# Patient Record
Sex: MALE | Race: WHITE | URBAN - METROPOLITAN AREA
[De-identification: names, ages, dates, MRNs, and addresses within clinical notes are randomized per-mention and may not be internally consistent; named-entity substitution may affect disease eponyms.]

---

## 2023-05-21 ENCOUNTER — OFFICE VISIT (OUTPATIENT)
Dept: FAMILY MEDICINE CLINIC | Facility: CLINIC | Age: 23
End: 2023-05-21
Payer: COMMERCIAL

## 2023-05-21 VITALS
BODY MASS INDEX: 18.88 KG/M2 | HEART RATE: 82 BPM | RESPIRATION RATE: 16 BRPM | HEIGHT: 76 IN | TEMPERATURE: 98 F | SYSTOLIC BLOOD PRESSURE: 117 MMHG | OXYGEN SATURATION: 98 % | DIASTOLIC BLOOD PRESSURE: 63 MMHG | WEIGHT: 155 LBS

## 2023-05-21 DIAGNOSIS — H66.012 NON-RECURRENT ACUTE SUPPURATIVE OTITIS MEDIA OF LEFT EAR WITH SPONTANEOUS RUPTURE OF TYMPANIC MEMBRANE: Primary | ICD-10-CM

## 2023-05-21 PROCEDURE — 99202 OFFICE O/P NEW SF 15 MIN: CPT | Performed by: PHYSICIAN ASSISTANT

## 2023-05-21 PROCEDURE — 3008F BODY MASS INDEX DOCD: CPT | Performed by: PHYSICIAN ASSISTANT

## 2023-05-21 PROCEDURE — 3074F SYST BP LT 130 MM HG: CPT | Performed by: PHYSICIAN ASSISTANT

## 2023-05-21 PROCEDURE — 3078F DIAST BP <80 MM HG: CPT | Performed by: PHYSICIAN ASSISTANT

## 2023-05-21 RX ORDER — AMOXICILLIN AND CLAVULANATE POTASSIUM 875; 125 MG/1; MG/1
1 TABLET, FILM COATED ORAL 2 TIMES DAILY
Qty: 20 TABLET | Refills: 0 | Status: SHIPPED | OUTPATIENT
Start: 2023-05-21 | End: 2023-05-31

## 2023-05-21 RX ORDER — OFLOXACIN 3 MG/ML
10 SOLUTION AURICULAR (OTIC) DAILY
Qty: 10 ML | Refills: 0 | Status: SHIPPED | OUTPATIENT
Start: 2023-05-21 | End: 2023-05-28

## 2023-12-15 ENCOUNTER — OFFICE VISIT (OUTPATIENT)
Dept: FAMILY MEDICINE CLINIC | Facility: CLINIC | Age: 23
End: 2023-12-15
Payer: COMMERCIAL

## 2023-12-15 VITALS
OXYGEN SATURATION: 98 % | WEIGHT: 155 LBS | RESPIRATION RATE: 18 BRPM | HEIGHT: 75 IN | BODY MASS INDEX: 19.27 KG/M2 | SYSTOLIC BLOOD PRESSURE: 118 MMHG | DIASTOLIC BLOOD PRESSURE: 64 MMHG | HEART RATE: 87 BPM | TEMPERATURE: 98 F

## 2023-12-15 DIAGNOSIS — J02.9 SORE THROAT: Primary | ICD-10-CM

## 2023-12-15 LAB
CONTROL LINE PRESENT WITH A CLEAR BACKGROUND (YES/NO): YES YES/NO
KIT LOT #: NORMAL NUMERIC
STREP GRP A CUL-SCR: NEGATIVE

## 2023-12-15 PROCEDURE — 99213 OFFICE O/P EST LOW 20 MIN: CPT | Performed by: FAMILY MEDICINE

## 2023-12-15 PROCEDURE — 87880 STREP A ASSAY W/OPTIC: CPT | Performed by: FAMILY MEDICINE

## 2023-12-15 PROCEDURE — 3008F BODY MASS INDEX DOCD: CPT | Performed by: FAMILY MEDICINE

## 2023-12-15 PROCEDURE — 3074F SYST BP LT 130 MM HG: CPT | Performed by: FAMILY MEDICINE

## 2023-12-15 PROCEDURE — 3078F DIAST BP <80 MM HG: CPT | Performed by: FAMILY MEDICINE

## 2024-03-03 ENCOUNTER — HOSPITAL ENCOUNTER (OUTPATIENT)
Age: 24
Discharge: HOME | End: 2024-03-03
Payer: COMMERCIAL

## 2024-03-03 ENCOUNTER — APPOINTMENT (OUTPATIENT)
Dept: GENERAL RADIOLOGY | Age: 24
End: 2024-03-03
Attending: PHYSICIAN ASSISTANT
Payer: COMMERCIAL

## 2024-03-03 VITALS
WEIGHT: 160 LBS | TEMPERATURE: 98 F | HEART RATE: 70 BPM | SYSTOLIC BLOOD PRESSURE: 123 MMHG | DIASTOLIC BLOOD PRESSURE: 73 MMHG | BODY MASS INDEX: 19.89 KG/M2 | OXYGEN SATURATION: 98 % | RESPIRATION RATE: 18 BRPM | HEIGHT: 75 IN

## 2024-03-03 DIAGNOSIS — S92.322A CLOSED DISPLACED FRACTURE OF SECOND METATARSAL BONE OF LEFT FOOT, INITIAL ENCOUNTER: Primary | ICD-10-CM

## 2024-03-03 PROCEDURE — E0114 CRUTCH UNDERARM PAIR NO WOOD: HCPCS | Performed by: NURSE PRACTITIONER

## 2024-03-03 PROCEDURE — 29515 APPLICATION SHORT LEG SPLINT: CPT | Performed by: NURSE PRACTITIONER

## 2024-03-03 PROCEDURE — 73630 X-RAY EXAM OF FOOT: CPT | Performed by: PHYSICIAN ASSISTANT

## 2024-03-03 PROCEDURE — 99203 OFFICE O/P NEW LOW 30 MIN: CPT | Performed by: NURSE PRACTITIONER

## 2024-03-03 RX ORDER — NAPROXEN 500 MG/1
500 TABLET ORAL 2 TIMES DAILY PRN
Qty: 20 TABLET | Refills: 0 | Status: SHIPPED | OUTPATIENT
Start: 2024-03-03 | End: 2024-03-10

## 2024-03-03 RX ORDER — IBUPROFEN 600 MG/1
600 TABLET ORAL ONCE
Status: COMPLETED | OUTPATIENT
Start: 2024-03-03 | End: 2024-03-03

## 2024-03-03 NOTE — ED PROVIDER NOTES
Patient Seen in: [facility]      History     Chief Complaint   Patient presents with    Leg or Foot Injury     Stated Complaint: LEFT FOOT PAIN X 1 DAY    Subjective:   HPI    Patient is a 23-year-old male with history of Achilles tendon tear and tendinitis here for evaluation of left foot pain.  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  Patient was at a meat yesterday, while stretching he felt left foot pain and then continued to run in his race which was 3K.  Has he ran, pain worsened.  Denies fall or obvious traumatic event precipitating onset of pain.  Has not taken over-the-counter medication for symptoms.  Pain is reproducible with weightbearing    Objective:   History reviewed. No pertinent past medical history.           No pertinent past surgical history.              No pertinent social history.            Review of Systems    Positive for stated complaint: LEFT FOOT PAIN X 1 DAY  Other systems are as noted in HPI.  Constitutional and vital signs reviewed.      All other systems reviewed and negative except as noted above.    Physical Exam     ED Triage Vitals [03/03/24 1225]   /73   Pulse 70   Resp 18   Temp 98 °F (36.7 °C)   Temp src Oral   SpO2 98 %   O2 Device None (Room air)       Current:/73   Pulse 70   Temp 98 °F (36.7 °C) (Oral)   Resp 18   Ht 190.5 cm (6' 3\")   Wt 72.6 kg   SpO2 98%   BMI 20.00 kg/m²         Physical Exam  Vitals and nursing note reviewed.   Constitutional:       General: He is not in acute distress.     Appearance: He is not ill-appearing, toxic-appearing or diaphoretic.   Eyes:      Conjunctiva/sclera: Conjunctivae normal.      Pupils: Pupils are equal, round, and reactive to light.   Cardiovascular:      Rate and Rhythm: Normal rate.      Pulses: Normal pulses.   Pulmonary:      Effort: Pulmonary effort is normal.   Musculoskeletal:      Left foot: No prominent metatarsal heads.        Feet:    Feet:      Right  foot:      Skin integrity: Skin integrity normal.      Left foot:      Skin integrity: Skin integrity normal.      Comments: Bony tenderness along the proximal metatarsal region.  There is no obvious ecchymosis, mild edema noted.  Neurological:      Mental Status: He is alert.         ED Course   Labs Reviewed - No data to display  XR FOOT, COMPLETE (MIN 3 VIEWS), LEFT (CPT=73630)    Result Date: 3/3/2024  CONCLUSION:  Comminuted and displaced fractures involving the proximal shaft of the left 2nd metatarsal with associated soft tissue swelling.    LOCATION:  [id number]   Dictated by (CST): Daniel Hopkins MD on 3/03/2024 at 1:26 PM     Finalized by (CST): Daniel Hopkins MD on 3/03/2024 at 1:26 PM                       MDM                                       Medical Decision Making  Differentials include but are not limited to stress fracture, tendinitis and fracture.  X-ray does reveal comminuted and displaced fractures involving the second metatarsal.  Short leg splint applied and crutches for mobility assistance.  Ibuprofen administered here.  Close outpatient follow-up with podiatry.  Father and patient agree with plan of care.  All questions answered to their satisfaction.    Amount and/or Complexity of Data Reviewed  Radiology: ordered and independent interpretation performed. Decision-making details documented in ED Course.    Risk  OTC drugs.        Disposition and Plan     Clinical Impression:  1. Closed displaced fracture of second metatarsal bone of left foot, initial encounter         Disposition:  Discharge  3/3/2024  2:12 pm    Follow-up:  Marlys Shukla, DPM  [address] [place] [id number]  [phone]    Schedule an appointment as soon as possible for a visit             Medications Prescribed:  Current Discharge Medication List        START taking these medications    Details   naproxen 500 MG Oral Tab Take 1 tablet (500 mg total) by mouth 2 (two) times daily as needed.  Qty: 20  tablet, Refills: 0

## 2024-03-05 ENCOUNTER — LAB ENCOUNTER (OUTPATIENT)
Dept: LAB | Age: 24
End: 2024-03-05
Attending: ORTHOPAEDIC SURGERY
Payer: COMMERCIAL

## 2024-03-05 DIAGNOSIS — S92.309A METATARSAL BONE FRACTURE: Primary | ICD-10-CM

## 2024-03-05 PROCEDURE — 36415 COLL VENOUS BLD VENIPUNCTURE: CPT

## 2024-03-05 PROCEDURE — 82306 VITAMIN D 25 HYDROXY: CPT

## 2024-03-06 ENCOUNTER — ORDER TRANSCRIPTION (OUTPATIENT)
Dept: PHYSICAL THERAPY | Facility: HOSPITAL | Age: 24
End: 2024-03-06

## 2024-03-06 DIAGNOSIS — S92.309A METATARSAL FRACTURE: Primary | ICD-10-CM

## 2024-03-06 LAB — VIT D+METAB SERPL-MCNC: 17.4 NG/ML (ref 30–100)

## 2024-04-16 ENCOUNTER — TELEPHONE (OUTPATIENT)
Dept: PHYSICAL THERAPY | Facility: HOSPITAL | Age: 24
End: 2024-04-16

## 2024-04-18 ENCOUNTER — OFFICE VISIT (OUTPATIENT)
Dept: PHYSICAL THERAPY | Facility: HOSPITAL | Age: 24
End: 2024-04-18
Attending: ORTHOPAEDIC SURGERY
Payer: COMMERCIAL

## 2024-04-18 DIAGNOSIS — S92.309A METATARSAL FRACTURE: Primary | ICD-10-CM

## 2024-04-18 PROCEDURE — 97530 THERAPEUTIC ACTIVITIES: CPT

## 2024-04-18 PROCEDURE — 97162 PT EVAL MOD COMPLEX 30 MIN: CPT

## 2024-04-18 NOTE — PROGRESS NOTES
LOWER EXTREMITY EVALUATION:     Diagnosis:   Metatarsal fracture (S92.309A) Left side, closed      Referring Provider: Sy Aguirre  Date of Evaluation:    4/18/2024    Precautions:  None Next MD visit:   5/29/24  Date of Surgery: n/a     PATIENT SUMMARY   Vince Garcia (preferred name: Reinier) is a 23 year old male who presents to therapy today with complaints of left foot pain.  He reports he had experienced a sharp pain  in the top of his L foot while getting ready for a race at an indoor track meet on 3/17/24.  He reports he then ran in his race and within the first 800m he had significant foot pain. States he finished the race but had severe pain.  Patient went to urgent care the next day and x-ray confirmed a 2nd metatarsal fracture. Patient reports he was in the walking boot for about 7 weeks, using bilateral axillary crutches for the first few weeks and then weaning down to just the boot. Patient reports he has been doing a little cross training, starting this about 2 weeks after the initial injury.  He states he had been riding the bike for about 4 weeks and has done a little elliptical the past 2 weeks, always in the boot. He reports he is tolerating this well.  Patient saw his physician this past Tuesday with x-ray confirming healing of the fracture.  He reports he was cleared to discontinue the boot and transition to weight bearing as tolerated. Pt describes pain level current 0/10, at best 0/10, at worst 1/10 the past few weeks.  States he will get a dull ache occasionally.  Denies any sharp pain.  Denies any N/T. 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  Prior to injury, his longest running distance for training would be about 20 miles.    Current functional limitations include: not performing impact type activities, limiting steps walking out of boot, avoiding certain exercises due to positional loading on the foot (like push up or plank  type activities), not currently running. Patient states his goal for PT is to gradually improve his strength and fitness level while ensuring proper healing and progress to running training to be ready for the fall cross country season.    Reinier describes prior level of function as independent with all ADL and very active with running activities.   Past medical history was reviewed. Significant findings include: nothing warranting precautions or contraindications for participation in PT.    ASSESSMENT  Patient present with findings consistent with medically referred diagnosis. Current impairments including the following: mild pain, decreased flexibility, decreased ROM, decreased strength, decreased proximal stabilization, impaired balance, impaired dynamic LE alignment control. Patient will benefit from physical therapy to address the above impairments and promote return to ADL and sport activities as prior without pain or compensation.     OBJECTIVE:   Observation: mild foot pronation B, R>L, increased femoral MR in standing both R and L (R>L) which is exaggerated in SLS.  Dynamic knee valgus B with squatting and stepping.  Palpation: mild tenderness to palpation over the dorsal aspect of the 2nd and 3rd metatarsal on L  Sensation: unremarkable    AROM: (* denotes performed with pain)  Hip Knee Foot/Ankle   Flexion: WNL  Extension: WNL  Abduction: WNL  ER: R 30; L 35  IR: R 55; L 50 WNL    DF: R 5; L 5  PF: R 40; L 30  INV: R 30; L 25  EV: R 10; L 12       Accessory motion: decreased posterior talar glide on L, decreased dorsal<>volar glide of 2nd-4th metatarsal on L    Flexibility:  Hip Flexor: tightness B  Hamstrings: NT  Piriformis: NT  Quads: tightness B  ITB: short B  Gastroc-soleus: tightness B    Strength/MMT: (* denotes performed with pain)  Hip Knee Foot/Ankle   Flexion: R 5/5; L 5/5  Extension: R 4+/5; L 4+/5  Abduction: R 4-/5; L 4-/5 (compensation of hip flexor bias bilaterally with testing)  ER: R  4-/5; L 4/5  IR: R 4-/5; L 4/5 Flexion: R 5/5; L 5/5  Extension: R 5/5; L 5/5    DF: R 5/5; L 5/5  PF: R 5/5; L 4-/5 (assessed non-WB)  INV: R 5/5; L 4+/5  EV: R 5/5; L 4+/5       Special tests:   Dynamic testing significant for dynamic knee valgus with DL squat and forward step up    Gait: pt ambulates on level ground without limp but note decrease toe off contributing to decreased step length.  Note increased femoral MR and foot pronation in stance phase of gait B  Balance: SLS: R 18 sec, L 12 sec    Today’s Treatment and Response:   Pt education was provided on exam findings, treatment diagnosis, treatment plan, expectations, and prognosis. Pt was also provided recommendations for weaning out of the boot as needed, avoiding non-use of the boot if he is experiencing onset of soreness with ADL in shoe. Discussion regarding cross-training with recommendation to incorporate swimming for cardio training and limiting elliptical as this is still mild impact to the healing foot - patient is agreeable to this. Brief discussion regarding foot wear with patient discouraged from purchasing new shoes at this time, waiting a few weeks until fully normalized gait is achieved.      Charges: PT Eval Moderate Complexity, TA      Total Timed Treatment: 12 min     Total Treatment Time: 45 min     Based on clinical rationale and outcome measures, this evaluation involved Moderate Complexity decision making due to 1-2 personal factors/comorbidities, 4+ body structures involved/activity limitations, and evolving symptoms including changing pain levels.  PLAN OF CARE:    Goals: (to be met in 18 visits)  Patient demonstrating improved DF >=10 deg to promote improved gait and proper mechanics with descending step and squatting  Improved hip strength to 5/5 throughout to promote improved proximal stabilization with patient performing squatting and stepping activities without dynamic knee valgus  Improved ankle strength to 5/5 throughout  with patient demonstrating improved ankle stabilization with SLB >30 sec on airex foam without LOB  Patient reporting ability to run >5 miles without onset of foot pain to promote full return to training as prior  Independent with a comprehensive HEP    Frequency / Duration: Patient will be seen for 2 x/week or a total of 18 visits over a 90 day period. Treatment will include: Gait training, Manual Therapy, Neuromuscular Re-education, Therapeutic Activities, Therapeutic Exercise, and Home Exercise Program instruction, Modalities as needed.    Education or treatment limitation: None  Rehab Potential:good    LEFS Score  LEFS Score: 86.25 % (4/18/2024  4:39 PM)      Patient/Family/Caregiver was advised of these findings, precautions, and treatment options and has agreed to actively participate in planning and for this course of care.    Thank you for your referral. Please co-sign or sign and return this letter via fax as soon as possible to [phone]. If you have any questions, please contact me at Dept: [phone]    Sincerely,  Electronically signed by therapist: Audelia Easley PT  Physician's certification required: Yes  I certify the need for these services furnished under this plan of treatment and while under my care.    X___________________________________________________ Date____________________    Certification From: 4/18/2024  To:7/17/2024

## 2024-04-23 ENCOUNTER — OFFICE VISIT (OUTPATIENT)
Dept: PHYSICAL THERAPY | Facility: HOSPITAL | Age: 24
End: 2024-04-23
Attending: ORTHOPAEDIC SURGERY
Payer: COMMERCIAL

## 2024-04-23 PROCEDURE — 97110 THERAPEUTIC EXERCISES: CPT

## 2024-04-23 NOTE — PROGRESS NOTES
Dx: Left 2nd Metatarsal Fracture (S92.309A)    Authorized # of visit: no visit limit/no auth Next MD visits: ~4 weeks  Fall Risk: standard Precautions: per MD order, WB as tolerated (just cleared to discontinue boot on 4/16/24)    Date: 4/23/24  Tx #: 2    Current Pain Level: 0/10      Subjective: Patient reports the foot has been doing pretty good.  States he was at a track meet this past weekend so wore his boot to this as it was a lot of standing and walking and he did fine in the boot.  No c/o any significant pain or discomfort.      Objective: Treatment per flow sheet.      Assessment: Issued the following to HEP today: 4-way ankle PRE, tri-planar marble , towel gastroc and soleus stretch, tband sdly clam, fire hydrant, single leg bridge, and tband ER chair squat.  Patient requiring minimal verbal/tactile cuing with clam and fire hydrant for proper lumbo-pelvic stabilization.  Patient benefits for use of mirror for visual feedback with performance of weight bearing exercises to promote increased hip ABD/ER facilitation to actively prevent dynamic knee valgus.      Plan: Continue per plan of care.      Treatment Flow:  Pt edu in and performance of HEP per below  A/P tilt board tap x30  Bosu squat x20  Bosu fsu and through x10 on R  6\" fsu on L w/ RTB ER x15  Red SPRI lateral walk x2 laps  Green SPRI glut med retro walk x2 laps      (HEP: 4-way ankle PRE, tri-planar marble , towel gastroc and soleus stretch, tband sdly clam, fire hydrant, single leg bridge, and tband ER chair squat)    Charges: 3 LIBBY  Total Timed Treatment: 45 minutes  Total Treatment Time: 45 minutes

## 2024-04-25 ENCOUNTER — OFFICE VISIT (OUTPATIENT)
Dept: PHYSICAL THERAPY | Facility: HOSPITAL | Age: 24
End: 2024-04-25
Attending: ORTHOPAEDIC SURGERY
Payer: COMMERCIAL

## 2024-04-25 PROCEDURE — 97110 THERAPEUTIC EXERCISES: CPT

## 2024-04-25 PROCEDURE — 97140 MANUAL THERAPY 1/> REGIONS: CPT

## 2024-04-25 NOTE — PROGRESS NOTES
Dx: Left 2nd Metatarsal Fracture (S92.309A)    Authorized # of visit: no visit limit/no auth Next MD visits: ~4 weeks  Fall Risk: standard Precautions: per MD order, WB as tolerated (just cleared to discontinue boot on 4/16/24)    Date: 4/25/24  Tx #: 3    Current Pain Level: 0/10      Subjective: Patient reports HEP is going well.  No c/o pain.  Still using the boot occasionally but not much, only if anticipating being on his foot for prolonged walking/standing such as at a track meet as spectator.    Objective: Treatment per flow sheet.      Assessment: Patient noting mild discomfort today with manual stretching of 2nd toe extensors but \"stretch, not pain\". Requires initial cuing with leg press activity to prevent dynamic knee valgus. Demonstrating improved control for proper LE alignment today with bosu exercises.  Incorporated \"hip burner\" exercise to HEP with patient tolerating this well with no c/o foot pain and demonstrating excellent glut med facilitation with this exercise.      Plan: Continue to advance therex as tolerated, being mindful of weight bearing and weight distribution with positioning, focusing on core, hip, and LE strengthening and proper LE alignment control in [facility].      Treatment Flow:  Manual: (20 min total)  STM to plantar surface of foot x6'  Manual posterior talar glide, grade 3-4, multiple bouts  Grade 2-3 metatarsal D<>V glides, multiple bouts  2nd toe extensor stretch, multiple bouts  Therex: (25 min)  Shuttle wobble board A/P SLP 1B 1G x30  Shuttle wobble board M/L SLP 1B 1G x30  Tilt board gastrox/soleus stretch x30\" each  Bosu squat w/ RTB ER x20  Bosu fsu and through x10 each R/L  Hip burner x10 each w/ 3\" hold R/L      (HEP: 4-way ankle PRE, tri-planar marble , towel gastroc and soleus stretch, tband sdly clam, fire hydrant, single leg bridge, and tband ER chair squat, hip burner)    Charges: Man, 2 LIBBY  Total Timed Treatment: 45 minutes  Total Treatment Time: 45  minutes

## 2024-04-30 ENCOUNTER — OFFICE VISIT (OUTPATIENT)
Dept: PHYSICAL THERAPY | Facility: HOSPITAL | Age: 24
End: 2024-04-30
Attending: ORTHOPAEDIC SURGERY
Payer: COMMERCIAL

## 2024-04-30 PROCEDURE — 97110 THERAPEUTIC EXERCISES: CPT

## 2024-04-30 PROCEDURE — 97140 MANUAL THERAPY 1/> REGIONS: CPT

## 2024-04-30 NOTE — PROGRESS NOTES
Dx: Left 2nd Metatarsal Fracture (S92.309A)    Authorized # of visit: no visit limit/no auth Next MD visits: ~4 weeks  Fall Risk: standard Precautions: per MD order, WB as tolerated (just cleared to discontinue boot on 4/16/24)    Date: 4/30/24  Tx #: 4    Current Pain Level: 0/10      Subjective: Patient reports foot has been feeling pretty good. States he was on his feet a lot 2 days ago and had a little soreness but otherwise it has been good.     Objective: Treatment per flow sheet.      Assessment: Patient does note some mild discomfort in the foot with 2nd toe extensor stretching today so intensity of stretch was lessened with patient tolerating this well. Patient demonstrating improved self-awareness with therex today for LE alignment control but continues to benefit from visual feedback with use of mirror as this visual feedback is easier to see movement faults than looking down at the LE.  No c/o foot pain with performance of therex and patient demonstrating improved stability on bosu today.    Plan: Continue per plan of care.      Treatment Flow:  Manual: (20 min total)  STM to plantar surface of foot x6'  Manual posterior talar glide, grade 3-4, multiple bouts  Grade 2-3 metatarsal D<>V glides, multiple bouts  2nd toe extensor stretch, multiple bouts  Therex: (25 min)  Shuttle wobble board A/P SLP 1B 1G x30  Shuttle wobble board M/L SLP 1B 1G x30  Tilt board gastroc/soleus stretch x30\" each  Bosu squat w/ RTB ER x20  Bosu fsu and through x10 each R/L  6\" retro lunge to toe tap x15 each R/L      (HEP: 4-way ankle PRE, tri-planar marble , towel gastroc and soleus stretch, tband sdly clam, fire hydrant, single leg bridge, and tband ER chair squat, hip burner)    Charges: Man, 2 LIBBY  Total Timed Treatment: 45 minutes  Total Treatment Time: 45 minutes

## 2024-05-02 ENCOUNTER — OFFICE VISIT (OUTPATIENT)
Dept: PHYSICAL THERAPY | Facility: HOSPITAL | Age: 24
End: 2024-05-02
Attending: ORTHOPAEDIC SURGERY
Payer: COMMERCIAL

## 2024-05-02 PROCEDURE — 97140 MANUAL THERAPY 1/> REGIONS: CPT

## 2024-05-02 PROCEDURE — 97110 THERAPEUTIC EXERCISES: CPT

## 2024-05-02 NOTE — PROGRESS NOTES
Dx: Left 2nd Metatarsal Fracture (S92.309A)    Authorized # of visit: no visit limit/no auth Next MD visits: ~4 weeks  Fall Risk: standard Precautions: per MD order, WB as tolerated (just cleared to discontinue boot on 4/16/24)    Date: 5/2/24  Tx #: 5    Current Pain Level: 0/10      Subjective: Patient reports the foot has been feeling okay with no reports of pain or discomfort of any kind.  HEP is going well and notes he is getting better with the marble pick-up activity.    Objective: Treatment per flow sheet.      Assessment: Patient tolerating addition of BAPs activity today demonstrating good ankle control and varying weight distribution through the foot without c/o pain. Also incorporated Maur[place]ian squat with patient demonstrating good LE alignment control.    Plan: Continue to advance weight bearing activities focusing on core, hip, and LE/foot/ankle strengthening with proper LE alignment control in [facility].      Treatment Flow:  Manual: (20 min total)  STM to plantar surface of foot x6'  Manual posterior talar glide, grade 3-4, multiple bouts  Grade 2-3 metatarsal D<>V glides, multiple bouts  2nd toe extensor stretch, multiple bouts  Therex: (20 min)  Tilt board gastroc/soleus stretch x30\" each  Bosu fsu and through x10 each R/L  6\" retro lunge to toe tap x15 each R/L  L1 BAPS x20 each cw/ccw  Single leg A/P tilt board tap x30  Tilt board stabilization x1'  RTB ER single leg mini squat x15 each R/L  [place]ian squat x15 each R/L      (HEP: 4-way ankle PRE, tri-planar marble , towel gastroc and soleus stretch, tband sdly clam, fire hydrant, single leg bridge, and tband ER chair squat, hip burner)    Charges: Man, 2 LIBBY  Total Timed Treatment: 40 minutes  Total Treatment Time: 40 minutes

## 2024-05-07 ENCOUNTER — OFFICE VISIT (OUTPATIENT)
Dept: PHYSICAL THERAPY | Facility: HOSPITAL | Age: 24
End: 2024-05-07
Attending: ORTHOPAEDIC SURGERY
Payer: COMMERCIAL

## 2024-05-07 PROCEDURE — 97110 THERAPEUTIC EXERCISES: CPT

## 2024-05-07 PROCEDURE — 97140 MANUAL THERAPY 1/> REGIONS: CPT

## 2024-05-07 NOTE — PROGRESS NOTES
Dx: Left 2nd Metatarsal Fracture (S92.309A)    Authorized # of visit: no visit limit/no auth Next MD visits: ~4 weeks  Fall Risk: standard Precautions: per MD order, WB as tolerated (just cleared to discontinue boot on 4/16/24)    Date: 5/7/24  Tx #: 6    Current Pain Level: 0/10      Subjective: Patient reports the foot has been doing well.  Went to a track meet this past weekend but wore the boot to avoid any pain/soreness with prolonged standing/walking.    Objective: Treatment per flow sheet.      Assessment: Patient has no c/o pain with therex today.  Demonstrates slighlty increased difficulty maintaining LE on R compared to left with bosu retro lunge toe tap but this improves significantly across reps.     Plan: Continue to advance weight bearing activities focusing on core, hip, and LE/foot/ankle strengthening with proper LE alignment control in [facility].      Treatment Flow:  Manual: (20 min total)  STM to plantar surface of foot x6'  Manual posterior talar glide, grade 3-4, multiple bouts  Grade 2-3 metatarsal D<>V glides, multiple bouts  2nd toe extensor stretch, multiple bouts  Therex: (20 min)  Tilt board gastroc/soleus stretch x30\" each  Bosu  retro lunge to toe tap x15 each R/L  L1 BAPS x20 each cw/ccw  Shuttle single leg heel raise on L 1B x30  Single leg A/P tilt board tap x30  Tilt board stabilization x1'  RTB ER single leg  Shuttle squat x30 each R/L 2B        (HEP: 4-way ankle PRE, tri-planar marble , towel gastroc and soleus stretch, tband sdly clam, fire hydrant, single leg bridge, and tband ER chair squat, hip burner)    Charges: Man, 2 LIBBY  Total Timed Treatment: 40 minutes  Total Treatment Time: 40 minutes

## 2024-05-09 ENCOUNTER — OFFICE VISIT (OUTPATIENT)
Dept: PHYSICAL THERAPY | Facility: HOSPITAL | Age: 24
End: 2024-05-09
Attending: ORTHOPAEDIC SURGERY
Payer: COMMERCIAL

## 2024-05-09 PROCEDURE — 97140 MANUAL THERAPY 1/> REGIONS: CPT

## 2024-05-09 PROCEDURE — 97110 THERAPEUTIC EXERCISES: CPT

## 2024-05-09 NOTE — PROGRESS NOTES
Dx: Left 2nd Metatarsal Fracture (S92.309A)    Authorized # of visit: no visit limit/no auth Next MD visits: ~4 weeks  Fall Risk: standard Precautions: per MD order, WB as tolerated (just cleared to discontinue boot on 4/16/24)    Date: 5/9/24  Tx #: 7    Current Pain Level: 0/10      Subjective: Patient reports exercises at home have been going well.  No c/o pain with ADL or HEP.  States the foot \"has been feeling pretty good\".    Objective: Treatment per flow sheet.      Assessment: Patient struggling initially with addition of vector lunge with triplanar UE cone reach, demonstrating dynamic knee valgus.  This improves significantly within the first cycle as patient demonstrates improved hip ER recruitment and motor control with verbal cuing.  Patient demonstrating improved excursion into weighted PF today with shuttle heel raise with no c/o pain in the foot.     Plan: Continue per plan of care.      Treatment Flow:  Manual: (20 min total)  STM to plantar surface of foot x6'  Manual posterior talar glide, grade 3-4, multiple bouts  Grade 2-3 metatarsal D<>V glides, multiple bouts  2nd toe extensor stretch, multiple bouts  Therex: (25 min)  SB bridge w/ alt SLR x15  Tilt board gastroc/soleus stretch x30\" each  Bosu single leg squat x15 each R/L  Icelandic squat x15 each R/L  Vector lunge triplanar UE reach x10 each R/L  Single leg A/P tilt board tap x30  Single leg medial/lateral tilt board tap w/ tops x30  Single leg medial/lateral tilt board stabilization  Shuttle single leg heel raise on L 1B 1Y x30      (HEP: 4-way ankle PRE, tri-planar marble , towel gastroc and soleus stretch, tband sdly clam, fire hydrant, single leg bridge, and tband ER chair squat, hip burner)    Charges: Man, 2 LIBBY  Total Timed Treatment: 45 minutes  Total Treatment Time: 45 minutes

## 2024-05-14 ENCOUNTER — APPOINTMENT (OUTPATIENT)
Dept: PHYSICAL THERAPY | Facility: HOSPITAL | Age: 24
End: 2024-05-14
Attending: ORTHOPAEDIC SURGERY
Payer: COMMERCIAL

## 2024-05-16 ENCOUNTER — OFFICE VISIT (OUTPATIENT)
Dept: PHYSICAL THERAPY | Facility: HOSPITAL | Age: 24
End: 2024-05-16
Attending: ORTHOPAEDIC SURGERY
Payer: COMMERCIAL

## 2024-05-16 PROCEDURE — 97110 THERAPEUTIC EXERCISES: CPT

## 2024-05-16 PROCEDURE — 97140 MANUAL THERAPY 1/> REGIONS: CPT

## 2024-05-16 NOTE — PROGRESS NOTES
Dx: Left 2nd Metatarsal Fracture (S92.309A)    Authorized # of visit: no visit limit/no auth Next MD visits: ~4 weeks  Fall Risk: standard Precautions: per MD order, WB as tolerated (just cleared to discontinue boot on 4/16/24)    Date: 5/16/24  Tx #: 8    Current Pain Level: 0/10      Subjective: Patient reports he went in to the city this past Tuesday and did a fair amount of walking and did not have any foot pain.    Objective: Treatment per flow sheet.      Assessment: Patient tolerating addition of powerslide activities today, performing lateral push-off and light lateral impact without c/o foot pain.  Patient demonstrating continued gains in proximal stabilization with therex, requiring only occasional verbal cuing for LE alignment correction throughout today's session.      Plan: Continue per plan of care.      Treatment Flow:  Manual: (10 min total)  STM to plantar surface of foot x5'  Manual posterior talar glide, grade 3-4, multiple bouts  Grade 2-3 metatarsal D<>V glides, multiple bouts  2nd toe extensor stretch, multiple bouts  Therex: (25 min)  Bosu (fsu) single leg squat x15 each R/L  Bosu (fsd) retro lunge toe tap x15 each R/L  Airex SLB multiplanar ball toss 2x10 each R/L  Powerslide lateral slides 2x60 sec  Powerslide mtn climber 2x30\"   Powerslide v-up 2x10  Powerslide lunge x15 each R/L  Single leg A/P tilt board tap x30  Single leg A/P tilt board stabilization x1 min  Single leg outside vector lunge cross body cone reach x10 each R/L      (HEP: 4-way ankle PRE, tri-planar marble , towel gastroc and soleus stretch, tband sdly clam, fire hydrant, single leg bridge, and tband ER chair squat, hip burner)    Charges: Man, 2 LIBBY  Total Timed Treatment: 50 minutes  Total Treatment Time: 50 minutes

## 2024-05-21 ENCOUNTER — OFFICE VISIT (OUTPATIENT)
Dept: PHYSICAL THERAPY | Facility: HOSPITAL | Age: 24
End: 2024-05-21
Attending: ORTHOPAEDIC SURGERY
Payer: COMMERCIAL

## 2024-05-21 PROCEDURE — 97110 THERAPEUTIC EXERCISES: CPT

## 2024-05-21 PROCEDURE — 97140 MANUAL THERAPY 1/> REGIONS: CPT

## 2024-05-21 NOTE — PROGRESS NOTES
Dx: Left 2nd Metatarsal Fracture (S92.309A)    Authorized # of visit: no visit limit/no auth Next MD visits: 5/29/24  Fall Risk: standard Precautions: per MD order, WB as tolerated (just cleared to discontinue boot on 4/16/24)    Date: 5/21/24  Tx #: 9    Current Pain Level: 0/10      Subjective: Patient reports he hiked over the weekend, roughly 5-6 miles, and tolerated this well with no foot pain. Cross-training right now with bike, elliptical, and pool and tolerating this well. Patient follows up with ortho MD next week.    Objective: Treatment per flow sheet.      Assessment: Incorporated light impact activity today in shuttle for weight-lessened single leg jump with patient tolerating this well - it takes the patient 3-4 reps initial to achieve proper LE alignment control through landing phase to prevent dynamic knee valgus but is then able to perform with consistent correct form for the remainder of reps.  Patient demonstrating good tolerance to loaded single leg activity on Airex foam with no reports of foot pain with foot adapting to the uneven surface.      Plan: Continue to advance core, hip, and LE strengthening gradually progressing into impact activities as tolerated (patient has MD follow-up next week with re-imaging to determine state of healing fx).      Treatment Flow:  Manual: (10 min total)  STM to plantar surface of foot x5'  Manual posterior talar glide, grade 3-4, multiple bouts  Grade 2-3 metatarsal D<>V glides, multiple bouts  2nd toe extensor stretch, multiple bouts  Therex: (30 min)  BAPs L2 x20 each cw/ccw  Airex Greenlandic squat x15 each R/L  Bosu (fsu) single leg squat x15 each R/L  Single leg A/P tilt board tap x30  Shuttle single leg jump (1 B) 2x15 each R/L    (HEP: 4-way ankle PRE, tri-planar marble , towel gastroc and soleus stretch, tband sdly clam, fire hydrant, single leg bridge, and tband ER chair squat, hip burner)    Charges: Man, 2 LIBBY  Total Timed Treatment: 40  minutes  Total Treatment Time: 40 minutes

## 2024-05-23 ENCOUNTER — OFFICE VISIT (OUTPATIENT)
Dept: PHYSICAL THERAPY | Facility: HOSPITAL | Age: 24
End: 2024-05-23
Attending: ORTHOPAEDIC SURGERY
Payer: COMMERCIAL

## 2024-05-23 PROCEDURE — 97140 MANUAL THERAPY 1/> REGIONS: CPT

## 2024-05-23 PROCEDURE — 97110 THERAPEUTIC EXERCISES: CPT

## 2024-05-23 NOTE — PROGRESS NOTES
Dx: Left 2nd Metatarsal Fracture (S92.309A)    Authorized # of visit: no visit limit/no auth Next MD visits: 5/29/24  Fall Risk: standard Precautions: per MD order, WB as tolerated (just cleared to discontinue boot on 4/16/24)    Date: 5/23/24  Tx #: 10    Current Pain Level: 0/10      Subjective: Patient reports the foot has been doing well with no c/o pain and tolerating increased time cross training (bike and elliptical) without pain. Patient reports he did a good amount of walking yesterday on grass as well as a moody surface without pain.    Objective: Treatment per flow sheet.      Assessment: Patient tolerating light impact activities today with no c/o pain (powerslide lateral push off and shuttle single leg jump).  Occasional tendency of R LE to fall into dynamic knee valgus with activities but patient is demonstrating improved self-awareness to then self-correct for the next repetition.  Patient is demonstrating good proximal stabilization throughout the core and hips with activities as well as improved balance with Airex stabilization exercise.    Plan: Continue per plan of care.        Treatment Flow:  Manual: (10 min total)  STM to plantar surface of foot x5'  Grade 2-3 metatarsal D<>V glides, multiple bouts  2nd toe extensor stretch, multiple bouts  Therex: (35 min)  Airex Frisian squat x15 each R/L  Single leg A/P tilt board tap x30  Power slide lateral slides 2x45\"  Power slide retro lunge x20 each R/L  Shuttle single leg jump 1B 1Y x20 each R/L  Shuttle alt single leg jump \"jog\": 1B 1Y 2x30  Triplanar vector lunge UE cone reach 2x5 cycles each R/L  Airex SLB multiplanar 1# plyoball \"toss and catch\" 2x10 each R/L    (HEP: 4-way ankle PRE, tri-planar marble , towel gastroc and soleus stretch, tband sdly clam, fire hydrant, single leg bridge, and tband ER chair squat, hip burner)    Charges: Man, 2 LIBBY  Total Timed Treatment: 45 minutes  Total Treatment Time: 45 minutes

## 2024-05-28 ENCOUNTER — OFFICE VISIT (OUTPATIENT)
Dept: PHYSICAL THERAPY | Facility: HOSPITAL | Age: 24
End: 2024-05-28
Attending: ORTHOPAEDIC SURGERY
Payer: COMMERCIAL

## 2024-05-28 PROCEDURE — 97110 THERAPEUTIC EXERCISES: CPT

## 2024-05-28 PROCEDURE — 97140 MANUAL THERAPY 1/> REGIONS: CPT

## 2024-05-28 NOTE — PROGRESS NOTES
Dx: Left 2nd Metatarsal Fracture (S92.309A)    Authorized # of visit: no visit limit/no auth Next MD visits: 5/29/24  Fall Risk: standard Precautions: per MD order, WB as tolerated (just cleared to discontinue boot on 4/16/24)    Date: 5/28/24  Tx #: 11    Current Pain Level: 0/10      Subjective: Patient reports the foot has been doing well with no c/o pain.  Still cross training with no running yet as he sees ortho tomorrow with x-ray to confirm healed fx.    Objective: Treatment per flow sheet.      Assessment: Patient tolerating addition of harness resisted back pedal today with no c/o pain with loaded rear stepping and initially struggling with dynamic knee valgus but is able to self correct within the first several repetitions.  Patient is demonstrating significantly improved LE alignment control with dynamic movements such as lunging and squatting.    Plan: Gradually incorporate impact activities as tolerated next session if imaging confirms healed fracture.        Treatment Flow:  Manual: (10 min total)  STM to plantar surface of foot x5'  Grade 2-3 metatarsal D<>V glides, multiple bouts  2nd toe extensor stretch, multiple bouts  Therex: (30 min)  Single leg A/P tilt board tap x30 each R/L  Power slide lateral slides 2x60\"  Power slide retro lunge x20 each R/L  Harness resisted back pedal squat 2x30'  Harness resisted lateral lunge walk 2x30'  Triplanar vector lunge UE cone reach 2x5 cycles each R/L  L2 BAPs cw/ccw x20 each    (HEP: 4-way ankle PRE, tri-planar marble , towel gastroc and soleus stretch, tband sdly clam, fire hydrant, single leg bridge, and tband ER chair squat, hip burner)    Charges: Man, 2 LIBBY  Total Timed Treatment: 40 minutes  Total Treatment Time: 40 minutes

## 2024-05-30 ENCOUNTER — OFFICE VISIT (OUTPATIENT)
Dept: PHYSICAL THERAPY | Facility: HOSPITAL | Age: 24
End: 2024-05-30
Attending: ORTHOPAEDIC SURGERY
Payer: COMMERCIAL

## 2024-05-30 PROCEDURE — 97140 MANUAL THERAPY 1/> REGIONS: CPT

## 2024-05-30 PROCEDURE — 97110 THERAPEUTIC EXERCISES: CPT

## 2024-06-04 ENCOUNTER — OFFICE VISIT (OUTPATIENT)
Dept: PHYSICAL THERAPY | Facility: HOSPITAL | Age: 24
End: 2024-06-04
Attending: ORTHOPAEDIC SURGERY
Payer: COMMERCIAL

## 2024-06-04 PROCEDURE — 97110 THERAPEUTIC EXERCISES: CPT

## 2024-06-04 NOTE — PROGRESS NOTES
Dx: Left 2nd Metatarsal Fracture (S92.309A)    Authorized # of visit: no visit limit/no auth Next MD visits: 5/29/24  Fall Risk: standard Precautions: per MD order, WB as tolerated (just cleared to discontinue boot on 4/16/24)    Date: 6/4/24  Tx #: 13    Current Pain Level: 0/10      Subjective: Patient reports he ran 3x since his last visit but only up to 1 mile at a time. Tolerated this well.  No c/o foot pain.       Objective: Treatment per flow sheet.      Assessment: Incorporated jumping activities today focusing on LE alignment control through take of and landing phase of both double and single leg jump/hop - patient demonstrating good LE alignment control on L and tolerating impact well.  Mild difficulty controlling alignment on R but patient improves with repetition and verbal cuing.  Patient is tolerating all therex without c/o foot pain.    Plan: Continue per plan of care.        Treatment Flow:  Manual: (6min total)  STM to plantar surface of foot x3  2nd toe extensor stretch, multiple bouts  Therex: (40 min)  Tilt board gastroc stretch 2x30\"  Tilt board soleus stretch 2x30\"  Harness resisted \"jog\" 4x30'  Harness resisted back pedal 4x30'  Harness resisted lateral shuffle  12\" plyobox double leg jump down x10  12\" plyobox double leg jump up x10  Single leg A/P hop over towel roll x10 each R/L  Single leg M/L hop over towel roll x10 each R/L  X grid single leg hop cw/ccw x3 cycles each R/L      (HEP: 4-way ankle PRE, tri-planar marble , towel gastroc and soleus stretch, tband sdly clam, fire hydrant, single leg bridge, and tband ER chair squat, hip burner)    Charges: 3 LIBBY  Total Timed Treatment: 46 minutes  Total Treatment Time: 46 minutes

## 2024-06-06 ENCOUNTER — OFFICE VISIT (OUTPATIENT)
Dept: PHYSICAL THERAPY | Facility: HOSPITAL | Age: 24
End: 2024-06-06
Attending: ORTHOPAEDIC SURGERY
Payer: COMMERCIAL

## 2024-06-06 PROCEDURE — 97110 THERAPEUTIC EXERCISES: CPT

## 2024-06-06 NOTE — PROGRESS NOTES
Dx: Left 2nd Metatarsal Fracture (S92.309A)    Authorized # of visit: no visit limit/no auth Next MD visits: 5/29/24  Fall Risk: standard Precautions: per MD order, WB as tolerated (just cleared to discontinue boot on 4/16/24)    Date: 6/4/24  Tx #: 13    Current Pain Level: 0/10      Subjective: Patient reports he continues to be pain free with progression into impact activities including running (still keeping very low mileage and only every other day).       Objective: Treatment per flow sheet.      Assessment: Brief running analysis on treadmill today - patient does have to limit hip/knee flexion to cut stride to avoid stepping on the fixed front of the treadmill but patient is demonstrating good LE alignment control into initial impact and stance phase.  Patient tolerating all therex without c/o foot pain and is demonstrating continued gains in LE alignment control as difficulty level of exercises are progressed.    Plan: Continue per plan of care.        Treatment Flow:  Manual: (5min total)  2nd toe extensor stretch, multiple bouts  Therex: (40 min)  Treadmill run x5' (stride analysis)  Harness resisted run 4x30'  Harness resisted back pedal 4x30'  Single leg wall squat 10-20H-10 R/L  Single leg wall squat 5-20H-5 R/L  Green SPRI lateral walk x30' R/L  Glut med green SPRI retro walk 2x30'  Red tband SLS multidirection kick  Bosu fsu and through to high knee w/ 3# OH press x15 each R/L  Shuttle 4-pt hip ext 1B 1G x20 each R/L        (HEP: 4-way ankle PRE, tri-planar marble , towel gastroc and soleus stretch, tband sdly clam, fire hydrant, single leg bridge, and tband ER chair squat, hip burner)    Charges: 3 LIBBY  Total Timed Treatment: 45 minutes  Total Treatment Time: 45 minutes

## 2024-06-11 ENCOUNTER — OFFICE VISIT (OUTPATIENT)
Dept: PHYSICAL THERAPY | Facility: HOSPITAL | Age: 24
End: 2024-06-11
Attending: ORTHOPAEDIC SURGERY
Payer: COMMERCIAL

## 2024-06-11 PROCEDURE — 97110 THERAPEUTIC EXERCISES: CPT

## 2024-06-12 NOTE — PROGRESS NOTES
Dx: Left 2nd Metatarsal Fracture (S92.309A)    Authorized # of visit: no visit limit/no auth Next MD visits: 5/29/24  Fall Risk: standard Precautions: per MD order, WB as tolerated (just cleared to discontinue boot on 4/16/24)    Date: 6/11/24  Tx #: 15    Current Pain Level: 0/10      Subjective: Patient reports he continues with cross training with the bike, elliptical, and pool and will be increasing mileage to 6 total miles this week and is currently symptom free.      Objective: Treatment per flow sheet.      Assessment: Patient demonstrating initial difficulty with LE alignment control today during performance of resisted fwd lunge walk and vector lunge multiplanar cone reach but is able to correct within the first several repetitions with minimal verbal cuing. Otherwise no difficulties with LE alignment control throughout the remainder of exercises.  Tolerating impact activities well with no c/o pain.    Plan: Continue to progress impact activities and progression into sport specific exercises as tolerated.        Treatment Flow:  Manual: (5min total)  2nd toe extensor stretch, multiple bouts  Therex: (40 min)  Harness resisted fwd lunge walk 4x30'  Harness resisted back pedal 4x30'  Single leg wall squat 10-20H-10 R/L  Single leg wall squat 5-10H-5 R/L  Single leg hop over towel:   -A/P x10 each R/L  -M/L x10 each R/L  Single leg A/P tilt board tap x30 each R/L  Shuttle retro lunge \"superman\" x10 each R/L      (HEP: 4-way ankle PRE, tri-planar marble , towel gastroc and soleus stretch, tband sdly clam, fire hydrant, single leg bridge, and tband ER chair squat, hip burner)    Charges: 3 LIBBY  Total Timed Treatment: 45 minutes  Total Treatment Time: 45 minutes

## 2024-06-13 ENCOUNTER — OFFICE VISIT (OUTPATIENT)
Dept: PHYSICAL THERAPY | Facility: HOSPITAL | Age: 24
End: 2024-06-13
Attending: ORTHOPAEDIC SURGERY
Payer: COMMERCIAL

## 2024-06-13 PROCEDURE — 97110 THERAPEUTIC EXERCISES: CPT

## 2024-06-13 NOTE — PROGRESS NOTES
Dx: Left 2nd Metatarsal Fracture (S92.309A)    Authorized # of visit: no visit limit/no auth Next MD visits: 5/29/24  Fall Risk: standard Precautions: per MD order, WB as tolerated (just cleared to discontinue boot on 4/16/24)    Date: 6/13/24  Tx #: 16    Current Pain Level: 0/10      Subjective: Patient reports the foot has been feeling good with no c/o pain and has been keeping to his running mileage per return to running protocol.      Objective: Treatment per flow sheet.      Assessment: Discussed trial of tapering PT to 1x/week and patient is agreeable trialing this next week.  Running assessment today (non-treadmill)-patient demonstrates increased left femoral MR from mid stance through toe off contributing to increased lower leg IR and foot inversion through initial swing phase. This improves but is not fully resolved with cuing to focus on active hip extension through push off.      Plan: Continue per plan of care.      Treatment Flow:  Manual: (5min total)  2nd toe extensor stretch, multiple bouts  Therex: (35 min)  Single leg hop over towel:   -A/P x10 each R/L  -M/L x10 each R/L  -X cw/ccw x4 cycles each R/L  Colored dots agility multi-directional single leg hop R/L 2x8 each R/L  \"Bounding\" x6 @30\"   Running form \"A\"s 2x30\"  Running form \"B\"s 2x30\"  Running assessment (off-treadmill)      (HEP: 4-way ankle PRE, tri-planar marble , towel gastroc and soleus stretch, tband sdly clam, fire hydrant, single leg bridge, and tband ER chair squat, hip burner)    Charges: 3 LIBBY  Total Timed Treatment: 40 minutes  Total Treatment Time: 40 minutes

## 2024-06-18 ENCOUNTER — APPOINTMENT (OUTPATIENT)
Dept: PHYSICAL THERAPY | Facility: HOSPITAL | Age: 24
End: 2024-06-18
Attending: ORTHOPAEDIC SURGERY
Payer: COMMERCIAL

## 2024-06-20 ENCOUNTER — OFFICE VISIT (OUTPATIENT)
Dept: PHYSICAL THERAPY | Facility: HOSPITAL | Age: 24
End: 2024-06-20
Attending: ORTHOPAEDIC SURGERY
Payer: COMMERCIAL

## 2024-06-20 PROCEDURE — 97530 THERAPEUTIC ACTIVITIES: CPT

## 2024-06-20 NOTE — PROGRESS NOTES
Dx: Left 2nd Metatarsal Fracture (S92.309A)    Authorized # of visit: no visit limit/no auth Next MD visits: 5/29/24  Fall Risk: standard Precautions: per MD order, WB as tolerated (just cleared to discontinue boot on 4/16/24)    Date: 6/20/24  Tx #: 17    Current Pain Level: 0/10      Subjective: Patient reports he has increased his daily running mileage to 1 1/2 miles with 0.5 miles of the run performed on grass and is tolerating this well with no c/o pain.  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      Objective: Treatment per flow sheet.      Assessment: Majority of today's treatment session was spent on running analysis and cuing for mechanics correction during running - noting initial foot strike in supination with rapid drop into pronation contributing toe-off with foot in relative ER/eversion.  Verbal cuing to \"roll off the toes\" contributes to a more neutral foot position at toe-off and decreases femoral IR at push off.      Plan: Continue to address proximal stabilization with focus on core/hip strength and LE mechanics in addition to running mechanics as tolerated.      Treatment Flow:  Manual: (5min total)  2nd toe extensor stretch, multiple bouts  There Act: (40 min)  Discussion regarding return to running plan/return to training/competition  Brief discussion regarding possible use of custom orthotics/posting  Running assessment outside/inside for speed and stride length variation  Verbal cuing working on running form for improved mechanics        (HEP: 4-way ankle PRE, tri-planar marble , towel gastroc and soleus stretch, tband sdly clam, fire hydrant, single leg bridge, and tband ER chair squat, hip burner)    Charges: 3 TA  Total Timed Treatment: 45 minutes  Total Treatment Time: 45 minutes

## 2024-06-25 ENCOUNTER — OFFICE VISIT (OUTPATIENT)
Dept: PHYSICAL THERAPY | Facility: HOSPITAL | Age: 24
End: 2024-06-25
Attending: ORTHOPAEDIC SURGERY
Payer: COMMERCIAL

## 2024-06-25 PROCEDURE — 97110 THERAPEUTIC EXERCISES: CPT

## 2024-06-25 NOTE — PROGRESS NOTES
Dx: Left 2nd Metatarsal Fracture (S92.309A)    Authorized # of visit: no visit limit/no auth Next MD visits: 5/29/24  Fall Risk: standard Precautions: per MD order, WB as tolerated (just cleared to discontinue boot on 4/16/24)    Date: 6/25/24  Tx #: 18    Current Pain Level: 0/10      Subjective: Patient states he increased his running mileage to 2 miles/day this week and it is feeling good so far with no c/o pain.  Has been trying to be conscious of LE dynamics with running form and states it still feels a little awkward but getting better.      Objective: Treatment per flow sheet.      Assessment: Patint able to perform single leg wall squat activity today on left while maintaining neutral foot/ankle positioning but has tendency to fall into ankle valgus/pronation with this activity on L - improves during second set on R with verbal cuing to active alignment control. Patient is tolerating impact activities without pain but will benefit from additional proximal stabilization to improve efficiency of movement.    Plan: Continue per plan of care.      Treatment Flow:  Manual: (5min total)  2nd toe extensor stretch, multiple bouts  Therex: (40 min)  Towel single leg jump A/P x10 eah R/L  Towel single leg \ jump x10 each R/L  Towel single leg + jump cw L, ccw R x6 each  Harness resisted back pedal run 4x30'  Harness resisted fwd run 4x40'  Single leg wall squat 10x-10\" hold-x10, 2 cycles each R/L  Bosu (fsd) retro lunge x10 each R/L        (HEP: 4-way ankle PRE, tri-planar marble , towel gastroc and soleus stretch, tband sdly clam, fire hydrant, single leg bridge, and tband ER chair squat, hip burner)    Charges: 3 LIBBY  Total Timed Treatment: 45 minutes  Total Treatment Time: 45 minutes

## 2024-07-02 ENCOUNTER — OFFICE VISIT (OUTPATIENT)
Dept: PHYSICAL THERAPY | Facility: HOSPITAL | Age: 24
End: 2024-07-02
Attending: ORTHOPAEDIC SURGERY
Payer: COMMERCIAL

## 2024-07-02 PROCEDURE — 97110 THERAPEUTIC EXERCISES: CPT

## 2024-07-02 NOTE — PROGRESS NOTES
Dx: Left 2nd Metatarsal Fracture (S92.309A)    Authorized # of visit: no visit limit/no auth Next MD visits: 7/10/24  Fall Risk: standard Precautions: none    Date: 7/2/24  Tx #: 19    Current Pain Level: 0/10      Subjective: Patient reports he is bumping up his mileage again this week per MD instruction and tolerating this well. Sees ortho MD next week for follow-up.  No c/o pain with running.      Objective: Treatment per flow sheet.      Assessment: Patient demonstrates greater proximal stabilization with single leg squat in shoe as compared to barefoot with brief reassessment today - patient will likely benefit from orthotic to provide greater stability distally at foot to improve efficiency with running, this was discussed and patient will speak with the ortho MD regarding this at next weeks follow up - has used [facility] in the past for running orthotics and I feel this would be appropriate.     Plan: Continue per plan of care.      Treatment Flow:  Manual: (5min total)  2nd toe extensor stretch, multiple bouts  Therex: (35 min)  Single leg squat x10 R/L with trial of medial posting  Bosu retro lunge toe tap superman 2x10 each R/L  Bosu single leg mini-squat hold w/ OH body blade 2x30\" each R/L  Bosu fsu and through to 4# OH  press 2x10 each R/L  SB bridge w/ alt SLR 2x10  SB \"gator tails\" 2x6    (HEP: 4-way ankle PRE, tri-planar marble , towel gastroc and soleus stretch, tband sdly clam, fire hydrant, single leg bridge, and tband ER chair squat, hip burner)    Charges: 3 LIBBY  Total Timed Treatment: 40 minutes  Total Treatment Time: 40 minutes

## 2024-07-11 ENCOUNTER — APPOINTMENT (OUTPATIENT)
Dept: PHYSICAL THERAPY | Facility: HOSPITAL | Age: 24
End: 2024-07-11
Attending: ORTHOPAEDIC SURGERY
Payer: COMMERCIAL

## 2024-07-16 ENCOUNTER — OFFICE VISIT (OUTPATIENT)
Dept: PHYSICAL THERAPY | Facility: HOSPITAL | Age: 24
End: 2024-07-16
Attending: ORTHOPAEDIC SURGERY
Payer: COMMERCIAL

## 2024-07-16 PROCEDURE — 97530 THERAPEUTIC ACTIVITIES: CPT

## 2024-07-16 NOTE — PROGRESS NOTES
Dx: Left 2nd Metatarsal Fracture (S92.309A)    Authorized # of visit: no visit limit/no auth Next MD visits: 7/10/24  Fall Risk: standard Precautions: none    Date: 7/16/24  Tx #: 20    Current Pain Level: 0/10      Subjective: Patient reports he saw ortho MD last week and the x-ray look sreally good with complete healing of the fracture.  States he was cleared to increase his mileage per week by MD and ran 6 miles today without any pain.      Objective: Treatment per flow sheet.      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  Additional discussion regarding acquiring orthotics (patient has purchased from [facility] in the past).  Discussion regarding POC for continuation of PT with sessions scheduled but with the understanding that we may cancel some of these as we go if he is progressing well with additional mileage.    Plan: Per above      Treatment Flow:  Per above - majority of today's session was spent on pt edu/discussion regarding progression or running, sport participation for cross-country season, etc      Charges: 3 TA  Total Timed Treatment: 40 minutes  Total Treatment Time: 40 minutes

## 2024-07-23 ENCOUNTER — APPOINTMENT (OUTPATIENT)
Dept: PHYSICAL THERAPY | Facility: HOSPITAL | Age: 24
End: 2024-07-23
Attending: ORTHOPAEDIC SURGERY
Payer: COMMERCIAL

## 2024-07-25 ENCOUNTER — APPOINTMENT (OUTPATIENT)
Dept: PHYSICAL THERAPY | Facility: HOSPITAL | Age: 24
End: 2024-07-25
Attending: ORTHOPAEDIC SURGERY
Payer: COMMERCIAL

## 2024-07-30 ENCOUNTER — OFFICE VISIT (OUTPATIENT)
Dept: PHYSICAL THERAPY | Facility: HOSPITAL | Age: 24
End: 2024-07-30
Attending: ORTHOPAEDIC SURGERY
Payer: COMMERCIAL

## 2024-07-30 PROCEDURE — 97110 THERAPEUTIC EXERCISES: CPT

## 2024-07-30 NOTE — PROGRESS NOTES
Dx: Left 2nd Metatarsal Fracture (S92.309A)    Authorized # of visit: no visit limit/no auth Next MD visits: 7/10/24  Fall Risk: standard Precautions: none    Date: 7/30/24  Tx #: 21    Current Pain Level: 0/10      Subjective: Patient reports he has increased his running mileage without c/o pain and ran up to a total of 13 miles, noting he has been able to increase his pace as well.      Objective: Treatment per flow sheet.      Assessment: Patient demonstrating good LE alignment control with single leg hop and bounding activities as well as brief check of running form.  Struggles to prevent dynamic knee valgus, most significant on the R, with plyometric squat jump activity - this improves with visual feedback in mirror but is still a little inconsistent.    Plan: Continue per plan of care- tapering frequency      Treatment Flow:  Manual: STM L mid-foot dorsal and volar x5'  Therex:  Single leg Triple hop R/L x5 each  Triple hop alternating leg x5  Bounding drill 6x25' each  High knee skip drill 4x25' each  Running form check (6x30')  Powerslide lateral slides  2x45\"  Powerslide retro lunge x20 each R/L  High plyobox-plyo sqaut jump 2x30\"  Single leg wall squat x10 ->10 sec single leg squat hold-> single leg wall squat x10 : both R/L      Charges: 3 LIBBY  Total Timed Treatment: 45 minutes  Total Treatment Time: 45 minutes

## 2024-08-15 ENCOUNTER — OFFICE VISIT (OUTPATIENT)
Dept: PHYSICAL THERAPY | Facility: HOSPITAL | Age: 24
End: 2024-08-15
Attending: ORTHOPAEDIC SURGERY
Payer: COMMERCIAL

## 2024-08-15 PROCEDURE — 97110 THERAPEUTIC EXERCISES: CPT

## 2024-08-15 NOTE — PROGRESS NOTES
Dx: Left 2nd Metatarsal Fracture (S92.309A)    Authorized # of visit: no visit limit/no auth Next MD visits: 7/10/24  Fall Risk: standard Precautions: none    Date: 8/15/24  Tx #: 22    Current Pain Level: 0/10      Subjective: Patient reports the foot has been feeling good and he has increased running mileage.  Got custom orthotics from [employer] [facility] and has been slowly breaking these in with no c/o pain or any issues. Starts with  [facility] team training tomorrow with time trial.    Objective: Treatment per flow sheet.      Assessment: Patient demonstrating improved LE alignment control when performing single leg hop as well as single leg jump up to 6\" box.  Occasional mild dynamic knee valgus when pushing off to jump up but this is significantly less than prior.  Slight decrease in proximal stability with ball on wall hip burner - discussed self-cuing to \"stay tall\" when exercises/running to facilitate core recruitment and proximal stabilization to promote greater efficiency with running form.    Plan: Tapering PT to 1x/week or every other week as appropriate.      Treatment Flow:  Manual: STM L mid-foot dorsal and volar x5'  Therex:  Towel single leg hop over and back x10 B  6\" single leg box jump x10 B  Ball on wall hip burner 3\" hold x10 each R/L  Side plank hold 10-up/down 10-hold 10 R/L      Charges: 2 LIBBY  Total Timed Treatment: 35 minutes  Total Treatment Time:  35 minutes

## 2024-08-20 ENCOUNTER — APPOINTMENT (OUTPATIENT)
Dept: PHYSICAL THERAPY | Facility: HOSPITAL | Age: 24
End: 2024-08-20
Attending: ORTHOPAEDIC SURGERY
Payer: COMMERCIAL

## 2024-08-22 ENCOUNTER — APPOINTMENT (OUTPATIENT)
Dept: PHYSICAL THERAPY | Facility: HOSPITAL | Age: 24
End: 2024-08-22
Attending: ORTHOPAEDIC SURGERY
Payer: COMMERCIAL

## 2024-08-27 ENCOUNTER — OFFICE VISIT (OUTPATIENT)
Dept: PHYSICAL THERAPY | Facility: HOSPITAL | Age: 24
End: 2024-08-27
Attending: ORTHOPAEDIC SURGERY
Payer: COMMERCIAL

## 2024-08-27 PROCEDURE — 97140 MANUAL THERAPY 1/> REGIONS: CPT

## 2024-08-27 PROCEDURE — 97530 THERAPEUTIC ACTIVITIES: CPT

## 2024-08-27 NOTE — PROGRESS NOTES
Dx: Left 2nd Metatarsal Fracture (S92.309A)    Authorized # of visit: no visit limit/no auth Next MD visits: none  Fall Risk: standard Precautions: none    Date: 8/27/24  Tx #: 23    Current Pain Level: 0/10      Subjective: Patient reports he increased running mileage this past to 70 miles and everything felt pretty good.     Objective: Treatment per flow sheet.     Assessment: Initial stiffness within the left foot today with mild discomfort in the MTP joint at 2nd toe - this resolves following STM and manual mobilization.  Patient is demonstrating excellent independence and compliance with HEP in addition to return to running.  Discussed continued focus on \"staying tall\" when running for greater proximal stabilization and running efficiency as well as prevention of dynamic knee valgus with HEP.    Plan: Tapering PT with 1 additional visit scheduled for follow-up in ~1 month.       Treatment Flow:  Manual: (25 min)  Manual STM throughout toe extensors and dorsal aspect of foot x8'  Intermetatarsal glides grade 3-4, multiple bouts  2nd toe MTP dorsal<>volar glides, multiple bouts  TA: (10 min)  Discussion regarding running progression, training schedule, and additional exercise regimen including HEP      Charges: MAN, TA  Total Timed Treatment: 35 minutes  Total Treatment Time:  35 minutes

## 2024-08-29 ENCOUNTER — APPOINTMENT (OUTPATIENT)
Dept: PHYSICAL THERAPY | Facility: HOSPITAL | Age: 24
End: 2024-08-29
Attending: ORTHOPAEDIC SURGERY
Payer: COMMERCIAL

## 2024-09-26 ENCOUNTER — OFFICE VISIT (OUTPATIENT)
Dept: PHYSICAL THERAPY | Facility: HOSPITAL | Age: 24
End: 2024-09-26
Attending: ORTHOPAEDIC SURGERY
Payer: COMMERCIAL

## 2024-09-26 PROCEDURE — 97530 THERAPEUTIC ACTIVITIES: CPT

## 2024-09-26 NOTE — PROGRESS NOTES
DISCHARGE SUMMARY     Diagnosis:   Metatarsal fracture (S92.309A) Left side, closed      Referring Provider: Sy Aguirre  Date of Evaluation:    4/18/2024    Precautions:  None Next MD visit:   5/29/24  Date of Surgery: n/a     PATIENT SUMMARY   Patient reports the left foot has been doing very well.  He denies any L foot pain and has been able to steadily return to running, increased mileage and pace without any L foot issues. 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  He has been able to return to competition.  He does note some recent right Wyarno's tendon pain, stating this started a few weeks ago but he has been icing and stretching and has been able to continue to train on it up to this point.    ASSESSMENT  The patient is presenting with resolved L metatarsal fracture.  He has tolerated progression throughout the course of treatment well and treatment has focused not only on foot/ankle recovery but also core, hip, and LE strengthening to promote improved LE mechanics with stepping, squatting, and running.  Patient is demonstrating improved self awareness to prevent dynamic knee/ankle valgus as well as improved running form.  He has progressed to running activities and comprehensive HEP.  He will be discharged from physical therapy at this time.    OBJECTIVE:   Observation: mild foot pronation B.  Slightly increased femoral MR in standing but patient is able to prevent dynamic LE valgus with single leg squat.  Palpation: unremarkable for L foot.  Do note mild swelling and tightness/tenderness throughout R Ranjan's with brief assessment consistent with Wyarno;s tendonitis  Sensation: unremarkable    AROM: (* denotes performed with pain)  Hip Knee Foot/Ankle   Flexion: WNL  Extension: WNL  Abduction: WNL  ER: WNL  IR: WNL WNL    DF: R 15; L 15  PF: R 45; L  45  INV: R 35; L 35  EV: R 15; L 15       Accessory motion: improved posterior talar glide on L to WNL, improved dorsal<>volar glide of 2nd-4th metatarsal on L to WNL    Flexibility:  Hip Flexor: mild tightness B  Hamstrings: mild tightness B  Piriformis: NT  Quads: tightness B  ITB: tightness B  Gastroc-soleus: tightness B    Strength/MMT: (* denotes performed with pain)  Hip Knee Foot/Ankle   Flexion: R 5/5; L 5/5  Extension: R 5/5; L 5/5  Abduction: R 5/5; L 5/5   ER: R 5/5; L 5/5  IR: R 5/5; L 5/5 Flexion: R 5/5; L 5/5  Extension: R 5/5; L 5/5    DF: R 5/5; L 5/5  PF: R 5/5; L 5/5   INV: R 5/5; L 5/5  EV: R 5/5; L 5/5       Special tests:   Dynamic testing - patient is able to prevent dynamic knee valgus with DL squat, forward step up, single leg squat and jumping activities    Gait: pt ambulates on level ground normalized gait    Balance: SLS:WNL    Today’s Treatment and Response:   Formal reassessment.  Brief examination of R Ranjan's tendon/gastroc-soleus complex as patient has new c/o R Ranjan's pain - findings are consistent with R Dayton's tendonitis so patient was instructed in HEP as well as possible use of training room facilities at school to receive US, GIASTM, kinesiology taping, as well as therex as appropriate.  Also briefly discussed possible use of heel cups in shoes to help off-load tendon if he is finding it difficult to alleviate symptoms.      Charges: 2TA      Total Timed Treatment: 30 min     Total Treatment Time: 30 min       PLAN OF CARE:    Goals: (to be met in 18 visits) ALL GOALS MET  Patient demonstrating improved DF >=10 deg to promote improved gait and proper mechanics with descending step and squatting  Improved hip strength to 5/5 throughout to promote improved proximal stabilization with patient performing squatting and stepping activities without dynamic knee valgus  Improved ankle strength to 5/5 throughout with patient demonstrating improved ankle stabilization with SLB >30  sec on airex foam without LOB  Patient reporting ability to run >5 miles without onset of foot pain to promote full return to training as prior  Independent with a comprehensive HEP    Frequency / Duration: DISCHARGE    LEFS Score  Post-test score 100%    Patient/Family/Caregiver was advised of these findings, precautions, and treatment options and has agreed to actively participate in planning and for this course of care.    Thank you for your referral. Please co-sign or sign and return this letter via fax as soon as possible to [phone]. If you have any questions, please contact me at Dept: [phone]    Sincerely,  Electronically signed by therapist: Audelia Easley, PT  Physician's certification required: NO

## 2024-12-18 ENCOUNTER — OFFICE VISIT (OUTPATIENT)
Dept: FAMILY MEDICINE CLINIC | Facility: CLINIC | Age: 24
End: 2024-12-18
Payer: COMMERCIAL

## 2024-12-18 VITALS
HEIGHT: 75.5 IN | RESPIRATION RATE: 16 BRPM | TEMPERATURE: 98 F | WEIGHT: 160 LBS | SYSTOLIC BLOOD PRESSURE: 120 MMHG | DIASTOLIC BLOOD PRESSURE: 50 MMHG | OXYGEN SATURATION: 99 % | HEART RATE: 88 BPM | BODY MASS INDEX: 19.69 KG/M2

## 2024-12-18 DIAGNOSIS — J01.00 ACUTE NON-RECURRENT MAXILLARY SINUSITIS: Primary | ICD-10-CM

## 2024-12-18 PROBLEM — S92.309A METATARSAL BONE FRACTURE: Status: RESOLVED | Noted: 2024-03-05 | Resolved: 2024-12-18

## 2024-12-18 PROCEDURE — 3078F DIAST BP <80 MM HG: CPT | Performed by: FAMILY MEDICINE

## 2024-12-18 PROCEDURE — 99203 OFFICE O/P NEW LOW 30 MIN: CPT | Performed by: FAMILY MEDICINE

## 2024-12-18 PROCEDURE — 3074F SYST BP LT 130 MM HG: CPT | Performed by: FAMILY MEDICINE

## 2024-12-18 PROCEDURE — 3008F BODY MASS INDEX DOCD: CPT | Performed by: FAMILY MEDICINE

## 2024-12-18 PROCEDURE — G2211 COMPLEX E/M VISIT ADD ON: HCPCS | Performed by: FAMILY MEDICINE

## 2024-12-18 RX ORDER — ALBUTEROL SULFATE 90 UG/1
1-2 INHALANT RESPIRATORY (INHALATION) EVERY 6 HOURS PRN
Qty: 8.5 G | Refills: 0 | Status: SHIPPED | OUTPATIENT
Start: 2024-12-18

## 2024-12-18 RX ORDER — FLUTICASONE PROPIONATE 50 MCG
2 SPRAY, SUSPENSION (ML) NASAL DAILY
Qty: 9.9 ML | Refills: 0 | Status: SHIPPED | OUTPATIENT
Start: 2024-12-18 | End: 2025-12-13

## 2024-12-18 RX ORDER — MOMETASONE FUROATE MONOHYDRATE 50 UG/1
SPRAY, METERED NASAL
COMMUNITY

## 2024-12-18 RX ORDER — BENZONATATE 200 MG/1
200 CAPSULE ORAL 3 TIMES DAILY PRN
Qty: 30 CAPSULE | Refills: 0 | Status: SHIPPED | OUTPATIENT
Start: 2024-12-18

## 2024-12-18 NOTE — PROGRESS NOTES
Vince Garcia is a 24 year old male who presents for upper respiratory symptoms for  3  weeks. Patient reports sore throat, congestion, cough with green/yellow colored sputum, sinus pain, wheezing. He denies nausea, vomiting, travel outside of country does states taht runs for college and hard to do due to cough.    Current Outpatient Medications   Medication Sig Dispense Refill    amoxicillin clavulanate 875-125 MG Oral Tab Take 1 tablet by mouth 2 (two) times daily for 10 days. 20 tablet 0    fluticasone propionate 50 MCG/ACT Nasal Suspension 2 sprays by Each Nare route daily. 9.9 mL 0    benzonatate 200 MG Oral Cap Take 1 capsule (200 mg total) by mouth 3 (three) times daily as needed for cough. 30 capsule 0    albuterol 108 (90 Base) MCG/ACT Inhalation Aero Soln Inhale 1-2 puffs into the lungs every 6 (six) hours as needed for Wheezing or Shortness of Breath. 8.5 g 0    mometasone furoate 50 MCG/ACT Nasal Suspension by Nasal route. (Patient not taking: Reported on 12/18/2024)        Past Medical History:    Metatarsal bone fracture      History reviewed. No pertinent surgical history.   History reviewed. No pertinent family history.     REVIEW OF SYSTEMS:   GENERAL: feels well otherwise  SKIN: no rashes  EYES:denies blurred vision or double vision  HEENT: congested  LUNGS: denies shortness of breath with exertion  CARDIOVASCULAR: denies chest pain on exertion  GI: no nausea or abdominal pain  NEURO: denies headaches    EXAM:   /50 (BP Location: Left arm, Patient Position: Sitting, Cuff Size: adult)   Pulse 88   Temp 97.9 °F (36.6 °C)   Resp 16   Ht 6' 3.5\" (1.918 m)   Wt 160 lb (72.6 kg)   SpO2 99%   BMI 19.73 kg/m²   GENERAL: well developed, well nourished,in no apparent distress  SKIN: no rashes,no suspicious lesions  EYES:PERRLA, EOMI, normal optic disk,conjunctiva are clear  HEENT: atraumatic, normocephalic,ears tm bugling, nasal erythema, edema,   NECK: supple,no adenopathy,no bruits  LUNGS:  clear to auscultation  CARDIO: RRR without murmur  GI: good BS's,no masses, HSM or tenderness    ASSESSMENT AND PLAN:   Vince Garcia is a 24 year old male who presents with Bronchitis or Sinusitis. PLAN: OTC decongestants, throat lozenges and tylenol and augmeintn for 10 days, flonase, tessalon and albuterol inhailer to help his run and symptom control .  The patient indicates understanding of these issues and agrees to the plan.  The patient is asked to return if sx's persist or worsen.

## 2025-01-03 ENCOUNTER — HOSPITAL ENCOUNTER (OUTPATIENT)
Dept: GENERAL RADIOLOGY | Age: 25
Discharge: HOME | End: 2025-01-03
Attending: FAMILY MEDICINE
Payer: COMMERCIAL

## 2025-01-03 ENCOUNTER — OFFICE VISIT (OUTPATIENT)
Dept: FAMILY MEDICINE CLINIC | Facility: CLINIC | Age: 25
End: 2025-01-03
Payer: COMMERCIAL

## 2025-01-03 VITALS
SYSTOLIC BLOOD PRESSURE: 108 MMHG | TEMPERATURE: 97 F | WEIGHT: 160 LBS | DIASTOLIC BLOOD PRESSURE: 62 MMHG | BODY MASS INDEX: 19.69 KG/M2 | HEIGHT: 75.5 IN | OXYGEN SATURATION: 98 % | HEART RATE: 62 BPM | RESPIRATION RATE: 16 BRPM

## 2025-01-03 DIAGNOSIS — R05.1 ACUTE COUGH: ICD-10-CM

## 2025-01-03 DIAGNOSIS — R05.1 ACUTE COUGH: Primary | ICD-10-CM

## 2025-01-03 PROCEDURE — 3074F SYST BP LT 130 MM HG: CPT | Performed by: FAMILY MEDICINE

## 2025-01-03 PROCEDURE — 99213 OFFICE O/P EST LOW 20 MIN: CPT | Performed by: FAMILY MEDICINE

## 2025-01-03 PROCEDURE — G2211 COMPLEX E/M VISIT ADD ON: HCPCS | Performed by: FAMILY MEDICINE

## 2025-01-03 PROCEDURE — 3078F DIAST BP <80 MM HG: CPT | Performed by: FAMILY MEDICINE

## 2025-01-03 PROCEDURE — 3008F BODY MASS INDEX DOCD: CPT | Performed by: FAMILY MEDICINE

## 2025-01-03 PROCEDURE — 71046 X-RAY EXAM CHEST 2 VIEWS: CPT | Performed by: FAMILY MEDICINE

## 2025-01-03 RX ORDER — DOXYCYCLINE HYCLATE 100 MG
100 TABLET ORAL 2 TIMES DAILY
Qty: 20 TABLET | Refills: 0 | Status: SHIPPED | OUTPATIENT
Start: 2025-01-03 | End: 2025-01-03

## 2025-01-03 RX ORDER — PREDNISONE 20 MG/1
50 TABLET ORAL DAILY
Qty: 13 TABLET | Refills: 0 | Status: SHIPPED | OUTPATIENT
Start: 2025-01-03 | End: 2025-01-08

## 2025-01-03 NOTE — PROGRESS NOTES
Vince Garcia is a 24 year old male.   Chief Complaint   Patient presents with    Chest Pain    Lung Problem     Feeling strained.      HPI:    I did talk 24-year-old male comes in for follow-up for his upper respiratory infections.  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  Patient states that he has recovered significantly but still has some long discomfort.  Patient wants to make sure that it is okay for him to resume running.  Patient denies any fevers chills nausea vomiting or diarrhea.  Patient states that the cough is for the most part gone.  His chest slight chest tenderness on the right side when he has a coughing fit.  Otherwise no other issues or concerns  Past Medical History:    Metatarsal bone fracture     History reviewed. No pertinent surgical history.  History reviewed. No pertinent family history.  Social History:  Social History     Socioeconomic History    Marital status: Single   Tobacco Use    Smoking status: Never     Passive exposure: Never    Smokeless tobacco: Never   Vaping Use    Vaping status: Never Used     Allergies:  Allergies[1]   Current Meds:  Current Outpatient Medications   Medication Sig Dispense Refill    predniSONE 20 MG Oral Tab Take 2.5 tablets (50 mg total) by mouth daily for 5 days. 13 tablet 0    fluticasone propionate 50 MCG/ACT Nasal Suspension 2 sprays by Each Nare route daily. 9.9 mL 0    benzonatate 200 MG Oral Cap Take 1 capsule (200 mg total) by mouth 3 (three) times daily as needed for cough. 30 capsule 0    albuterol 108 (90 Base) MCG/ACT Inhalation Aero Soln Inhale 1-2 puffs into the lungs every 6 (six) hours as needed for Wheezing or Shortness of Breath. 8.5 g 0    mometasone furoate 50 MCG/ACT Nasal Suspension by Nasal route. (Patient not taking: Reported on 1/3/2025)          ROS:   GENERAL HEALTH: feels well otherwise  SKIN: denies any unusual skin lesions or rashes  RESPIRATORY: denies shortness of breath with  exertion  CARDIOVASCULAR: denies chest pain on exertion  GI: denies abdominal pain and denies heartburn  NEURO: denies headaches    PHYSICAL EXAM:   /62 (BP Location: Left arm, Patient Position: Sitting, Cuff Size: adult)   Pulse 62   Temp 97.1 °F (36.2 °C)   Resp 16   Ht 6' 3.5\" (1.918 m)   Wt 160 lb (72.6 kg)   SpO2 98%   BMI 19.73 kg/m²   GENERAL HEALTH: well developed, well nourished, in no apparent distress  EYES: sclera anicteric, conjunctiva normal  HEENT: normocephalic; normal pharynx  NECK: supple; no JVD, no LAD  RESPIRATORY: clear to auscultation bilaterally, no tachypnea  CARDIOVASCULAR: S1, S2 normal, no S3, no S4; no click; no murmur  EXTREMITIES: no cyanosis, clubbing or edema, peripheral pulses intact  PSYCHIATRIC: alert and oriented x 3; affect appropriate      ASSESSMENT/ PLAN:     Diagnoses and all orders for this visit:    Acute cough  -     XR CHEST PA + LAT CHEST (CPT=71046); Future    Other orders  -     Discontinue: Doxycycline Hyclate 100 MG Oral Tab; Take 1 tablet (100 mg total) by mouth 2 (two) times daily for 10 days.  -     predniSONE 20 MG Oral Tab; Take 2.5 tablets (50 mg total) by mouth daily for 5 days.    Examination is unremarkable.  He had a chest x-ray to assess as well as put him on a trial of prednisone 50 mg daily for the next 5 days in order to propel him into his season.  Patient agreeable with plan    The patient is to return to office in prn  The patient is to return to office for persistent or worsening signs and symptoms.   The proper use of medication and possible side effects discussed with patient.  An AVS was given to patient.  The patient verbalized understanding, agrees to treatment regimen and all questions were answered.        [1] No Known Allergies

## (undated) NOTE — LETTER
Patient Name: Vince Garcia  YOB: 2000          MRN number:  [id number]  Date:  4/18/2024  Referring Physician:  Sy Aguirre         LOWER EXTREMITY EVALUATION:     Diagnosis:   Metatarsal fracture (S92.309A) Left side, closed      Referring Provider: Sy Aguirre  Date of Evaluation:    4/18/2024    Precautions:  None Next MD visit:   5/29/24  Date of Surgery: n/a     PATIENT SUMMARY   Vince Garcia (preferred name: Reinier) is a 23 year old male who presents to therapy today with complaints of left foot pain.  He reports he had experienced a sharp pain  in the top of his L foot while getting ready for a race at an indoor track meet on 3/17/24.  He reports he then ran in his race and within the first 800m he had significant foot pain. States he finished the race but had severe pain.  Patient went to urgent care the next day and x-ray confirmed a 2nd metatarsal fracture. Patient reports he was in the walking boot for about 7 weeks, using bilateral axillary crutches for the first few weeks and then weaning down to just the boot. Patient reports he has been doing a little cross training, starting this about 2 weeks after the initial injury.  He states he had been riding the bike for about 4 weeks and has done a little elliptical the past 2 weeks, always in the boot. He reports he is tolerating this well.  Patient saw his physician this past Tuesday with x-ray confirming healing of the fracture.  He reports he was cleared to discontinue the boot and transition to weight bearing as tolerated. Pt describes pain level current 0/10, at best 0/10, at worst 1/10 the past few weeks.  States he will get a dull ache occasionally.  Denies any sharp pain.  Denies any N/T. 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  Prior to injury, his longest running distance for training would be about 20 miles.    Current functional limitations include: not performing  impact type activities, limiting steps walking out of boot, avoiding certain exercises due to positional loading on the foot (like push up or plank type activities), not currently running. Patient states his goal for PT is to gradually improve his strength and fitness level while ensuring proper healing and progress to running training to be ready for the fall cross country season.    Reinier describes prior level of function as independent with all ADL and very active with running activities.   Past medical history was reviewed. Significant findings include: nothing warranting precautions or contraindications for participation in PT.    ASSESSMENT  Patient present with findings consistent with medically referred diagnosis. Current impairments including the following: mild pain, decreased flexibility, decreased ROM, decreased strength, decreased proximal stabilization, impaired balance, impaired dynamic LE alignment control. Patient will benefit from physical therapy to address the above impairments and promote return to ADL and sport activities as prior without pain or compensation.     OBJECTIVE:   Observation: mild foot pronation B, R>L, increased femoral MR in standing both R and L (R>L) which is exaggerated in SLS.  Dynamic knee valgus B with squatting and stepping.  Palpation: mild tenderness to palpation over the dorsal aspect of the 2nd and 3rd metatarsal on L  Sensation: unremarkable    AROM: (* denotes performed with pain)  Hip Knee Foot/Ankle   Flexion: WNL  Extension: WNL  Abduction: WNL  ER: R 30; L 35  IR: R 55; L 50 WNL    DF: R 5; L 5  PF: R 40; L 30  INV: R 30; L 25  EV: R 10; L 12       Accessory motion: decreased posterior talar glide on L, decreased dorsal<>volar glide of 2nd-4th metatarsal on L    Flexibility:  Hip Flexor: tightness B  Hamstrings: NT  Piriformis: NT  Quads: tightness B  ITB: short B  Gastroc-soleus: tightness B    Strength/MMT: (* denotes performed with pain)  Hip Knee Foot/Ankle    Flexion: R 5/5; L 5/5  Extension: R 4+/5; L 4+/5  Abduction: R 4-/5; L 4-/5 (compensation of hip flexor bias bilaterally with testing)  ER: R 4-/5; L 4/5  IR: R 4-/5; L 4/5 Flexion: R 5/5; L 5/5  Extension: R 5/5; L 5/5    DF: R 5/5; L 5/5  PF: R 5/5; L 4-/5 (assessed non-WB)  INV: R 5/5; L 4+/5  EV: R 5/5; L 4+/5       Special tests:   Dynamic testing significant for dynamic knee valgus with DL squat and forward step up    Gait: pt ambulates on level ground without limp but note decrease toe off contributing to decreased step length.  Note increased femoral MR and foot pronation in stance phase of gait B  Balance: SLS: R 18 sec, L 12 sec    Today’s Treatment and Response:   Pt education was provided on exam findings, treatment diagnosis, treatment plan, expectations, and prognosis. Pt was also provided recommendations for weaning out of the boot as needed, avoiding non-use of the boot if he is experiencing onset of soreness with ADL in shoe. Discussion regarding cross-training with recommendation to incorporate swimming for cardio training and limiting elliptical as this is still mild impact to the healing foot - patient is agreeable to this. Brief discussion regarding foot wear with patient discouraged from purchasing new shoes at this time, waiting a few weeks until fully normalized gait is achieved.      Charges: PT Eval Moderate Complexity, TA      Total Timed Treatment: 12 min     Total Treatment Time: 45 min     Based on clinical rationale and outcome measures, this evaluation involved Moderate Complexity decision making due to 1-2 personal factors/comorbidities, 4+ body structures involved/activity limitations, and evolving symptoms including changing pain levels.  PLAN OF CARE:    Goals: (to be met in 18 visits)  Patient demonstrating improved DF >=10 deg to promote improved gait and proper mechanics with descending step and squatting  Improved hip strength to 5/5 throughout to promote improved proximal  stabilization with patient performing squatting and stepping activities without dynamic knee valgus  Improved ankle strength to 5/5 throughout with patient demonstrating improved ankle stabilization with SLB >30 sec on airex foam without LOB  Patient reporting ability to run >5 miles without onset of foot pain to promote full return to training as prior  Independent with a comprehensive HEP    Frequency / Duration: Patient will be seen for 2 x/week or a total of 18 visits over a 90 day period. Treatment will include: Gait training, Manual Therapy, Neuromuscular Re-education, Therapeutic Activities, Therapeutic Exercise, and Home Exercise Program instruction, Modalities as needed.    Education or treatment limitation: None  Rehab Potential:good    LEFS Score  LEFS Score: 86.25 % (4/18/2024  4:39 PM)      Patient/Family/Caregiver was advised of these findings, precautions, and treatment options and has agreed to actively participate in planning and for this course of care.    Thank you for your referral. Please co-sign or sign and return this letter via fax as soon as possible to [phone]. If you have any questions, please contact me at Dept: [phone]    Sincerely,  Electronically signed by therapist: Audelia Easley PT  Physician's certification required: Yes  I certify the need for these services furnished under this plan of treatment and while under my care.    X___________________________________________________ Date____________________    Certification From: 4/18/2024  To:7/17/2024      21st Century Cures Act Notice to Patient: Medical documents like this are made available to patients in the interest of transparency. However, be advised this is a medical document and it is intended as [id number] communication between your medical providers. This medical document may contain abbreviations, assessments, medical data, and results or other terms that are unfamiliar. Medical documents are intended to  carry relevant information, facts as evident, and the clinical opinion of the practitioner. As such, this medical document may be written in language that appears blunt or direct. You are encouraged to contact your medical provider and/or [facility] Patient Experience if you have any questions about this medical document.